# Patient Record
Sex: FEMALE | Race: WHITE | URBAN - NONMETROPOLITAN AREA
[De-identification: names, ages, dates, MRNs, and addresses within clinical notes are randomized per-mention and may not be internally consistent; named-entity substitution may affect disease eponyms.]

---

## 2020-09-10 ENCOUNTER — NEW PATIENT (OUTPATIENT)
Dept: URBAN - NONMETROPOLITAN AREA CLINIC 12 | Facility: CLINIC | Age: 70
End: 2020-09-10
Payer: MEDICARE

## 2020-09-10 DIAGNOSIS — H25.813 COMBINED FORMS OF AGE-RELATED CATARACT, BILATERAL: Primary | ICD-10-CM

## 2020-09-10 DIAGNOSIS — H16.223 KERATOCONJUNCTIVITIS SICCA, BILATERAL: ICD-10-CM

## 2020-09-10 PROCEDURE — 92004 COMPRE OPH EXAM NEW PT 1/>: CPT | Performed by: OPTOMETRIST

## 2020-09-10 ASSESSMENT — INTRAOCULAR PRESSURE
OS: 19
OD: 19

## 2020-09-10 ASSESSMENT — VISUAL ACUITY
OS: 20/25
OD: 20/30

## 2021-06-17 ENCOUNTER — OFFICE VISIT (OUTPATIENT)
Dept: URBAN - NONMETROPOLITAN AREA CLINIC 12 | Facility: CLINIC | Age: 71
End: 2021-06-17
Payer: MEDICARE

## 2021-06-17 PROCEDURE — 99213 OFFICE O/P EST LOW 20 MIN: CPT | Performed by: OPTOMETRIST

## 2021-06-17 ASSESSMENT — INTRAOCULAR PRESSURE
OD: 18
OS: 18

## 2021-06-17 ASSESSMENT — VISUAL ACUITY
OD: 20/25
OS: 20/25

## 2021-06-17 NOTE — IMPRESSION/PLAN
Impression: Keratoconjunctivitis sicca, bilateral Plan: Start ATs QID OU. Educated pt. on need for regular treatment and F/U.

## 2021-06-17 NOTE — IMPRESSION/PLAN
Impression: Combined forms of age-related cataract, bilateral Plan: Significantly worsened since last visit. Monitor x 6 months. RTC with any changes to vision.

## 2022-07-20 ENCOUNTER — OFFICE VISIT (OUTPATIENT)
Dept: URBAN - NONMETROPOLITAN AREA CLINIC 14 | Facility: CLINIC | Age: 72
End: 2022-07-20
Payer: MEDICARE

## 2022-07-20 DIAGNOSIS — H25.813 COMBINED FORMS OF AGE-RELATED CATARACT, BILATERAL: Primary | ICD-10-CM

## 2022-07-20 DIAGNOSIS — H16.223 KERATOCONJUNCTIVITIS SICCA, BILATERAL: ICD-10-CM

## 2022-07-20 PROCEDURE — 99214 OFFICE O/P EST MOD 30 MIN: CPT | Performed by: OPTOMETRIST

## 2022-07-20 ASSESSMENT — VISUAL ACUITY
OD: 20/30
OS: 20/30

## 2022-07-20 ASSESSMENT — INTRAOCULAR PRESSURE
OS: 13
OD: 16

## 2022-07-20 NOTE — IMPRESSION/PLAN
Impression: Combined forms of age-related cataract, bilateral Plan: Refer for CE OU. Educated pt. on risks/benefits of CE. Pt. elects to proceed with CE at this time.

## 2022-09-01 ENCOUNTER — PRE-OPERATIVE VISIT (OUTPATIENT)
Dept: URBAN - NONMETROPOLITAN AREA CLINIC 13 | Facility: CLINIC | Age: 72
End: 2022-09-01
Payer: MEDICARE

## 2022-09-01 DIAGNOSIS — H25.812 COMBINED FORMS OF AGE-RELATED CATARACT, LEFT EYE: ICD-10-CM

## 2022-09-01 DIAGNOSIS — H25.813 COMBINED FORMS OF AGE-RELATED CATARACT, BILATERAL: Primary | ICD-10-CM

## 2022-09-01 PROCEDURE — 99204 OFFICE O/P NEW MOD 45 MIN: CPT | Performed by: OPHTHALMOLOGY

## 2022-09-01 ASSESSMENT — INTRAOCULAR PRESSURE
OS: 16
OD: 16

## 2022-09-01 ASSESSMENT — VISUAL ACUITY
OS: 20/25
OD: 20/25

## 2022-09-01 NOTE — IMPRESSION/PLAN
Impression: Combined forms of age-related cataract, bilateral: H25.813. Plan: Discussed diagnosis in detail with patient. Discussed treatment options with patient. Patient elects to have surgery. Surgical risks and benefits were discussed, explained and understood by patient.  OD then OS std iol aim -2.25 OD -2.25 OS ok dexycu order ascan/optical biometry for iol calculations

## 2022-09-12 ENCOUNTER — ADULT PHYSICAL (OUTPATIENT)
Dept: URBAN - NONMETROPOLITAN AREA CLINIC 13 | Facility: CLINIC | Age: 72
End: 2022-09-12
Payer: MEDICARE

## 2022-09-12 DIAGNOSIS — Z01.818 ENCOUNTER FOR OTHER PREPROCEDURAL EXAMINATION: Primary | ICD-10-CM

## 2022-09-12 DIAGNOSIS — H25.813 COMBINED FORMS OF AGE-RELATED CATARACT, BILATERAL: ICD-10-CM

## 2022-09-12 PROCEDURE — 99203 OFFICE O/P NEW LOW 30 MIN: CPT | Performed by: PHYSICIAN ASSISTANT

## 2022-09-19 ENCOUNTER — SURGERY (OUTPATIENT)
Dept: URBAN - NONMETROPOLITAN AREA SURGERY 4 | Facility: SURGERY | Age: 72
End: 2022-09-19
Payer: MEDICARE

## 2022-09-19 DIAGNOSIS — H25.813 COMBINED FORMS OF AGE-RELATED CATARACT, BILATERAL: Primary | ICD-10-CM

## 2022-09-19 PROCEDURE — 66984 XCAPSL CTRC RMVL W/O ECP: CPT | Performed by: OPHTHALMOLOGY

## 2022-09-20 ENCOUNTER — POST-OPERATIVE VISIT (OUTPATIENT)
Dept: URBAN - NONMETROPOLITAN AREA CLINIC 14 | Facility: CLINIC | Age: 72
End: 2022-09-20
Payer: MEDICARE

## 2022-09-20 DIAGNOSIS — Z48.810 ENCOUNTER FOR SURGICAL AFTERCARE FOLLOWING SURGERY ON A SENSE ORGAN: Primary | ICD-10-CM

## 2022-09-20 PROCEDURE — 99024 POSTOP FOLLOW-UP VISIT: CPT | Performed by: OPTOMETRIST

## 2022-09-20 ASSESSMENT — INTRAOCULAR PRESSURE
OS: 14
OD: 14

## 2022-09-28 ENCOUNTER — POST-OPERATIVE VISIT (OUTPATIENT)
Dept: URBAN - NONMETROPOLITAN AREA CLINIC 14 | Facility: CLINIC | Age: 72
End: 2022-09-28
Payer: MEDICARE

## 2022-09-28 DIAGNOSIS — Z48.810 ENCOUNTER FOR SURGICAL AFTERCARE FOLLOWING SURGERY ON A SENSE ORGAN: Primary | ICD-10-CM

## 2022-09-28 PROCEDURE — 99024 POSTOP FOLLOW-UP VISIT: CPT | Performed by: OPTOMETRIST

## 2022-09-28 RX ORDER — PREDNISOLONE ACETATE 10 MG/ML
1 % SUSPENSION/ DROPS OPHTHALMIC
Qty: 10 | Refills: 0 | Status: ACTIVE
Start: 2022-09-28

## 2022-09-28 ASSESSMENT — VISUAL ACUITY
OD: 20/20
OS: 20/25

## 2022-09-28 ASSESSMENT — INTRAOCULAR PRESSURE
OD: 11
OS: 12

## 2022-09-28 NOTE — IMPRESSION/PLAN
Impression: S/P Cataract Extraction by phacoemulsification with IOL placement OD - 9 Days. Encounter for surgical aftercare following surgery on a sense organ  Z48.810. Plan: Pt doing well. Proceed with CE OS. Start Pred Forte QD OD. RTC PRN with increased symptoms.

## 2022-10-10 ENCOUNTER — SURGERY (OUTPATIENT)
Dept: URBAN - NONMETROPOLITAN AREA SURGERY 4 | Facility: SURGERY | Age: 72
End: 2022-10-10
Payer: MEDICARE

## 2022-10-10 DIAGNOSIS — H25.812 COMBINED FORMS OF AGE-RELATED CATARACT, LEFT EYE: Primary | ICD-10-CM

## 2022-10-10 PROCEDURE — 66984 XCAPSL CTRC RMVL W/O ECP: CPT | Performed by: OPHTHALMOLOGY

## 2022-10-11 ENCOUNTER — POST-OPERATIVE VISIT (OUTPATIENT)
Dept: URBAN - NONMETROPOLITAN AREA CLINIC 14 | Facility: CLINIC | Age: 72
End: 2022-10-11
Payer: MEDICARE

## 2022-10-11 DIAGNOSIS — Z48.810 ENCOUNTER FOR SURGICAL AFTERCARE FOLLOWING SURGERY ON A SENSE ORGAN: Primary | ICD-10-CM

## 2022-10-11 PROCEDURE — 99024 POSTOP FOLLOW-UP VISIT: CPT | Performed by: OPTOMETRIST

## 2022-10-11 ASSESSMENT — INTRAOCULAR PRESSURE
OD: 11
OS: 14

## 2022-10-11 NOTE — IMPRESSION/PLAN
Impression: S/P Cataract Extraction by phacoemulsification with IOL placement OD - 22 Days. Encounter for surgical aftercare following surgery on a sense organ  Z48.810. Plan: Pt doing well. RTC 1 week for F/U or sooner with changes.

## 2022-10-18 ENCOUNTER — POST-OPERATIVE VISIT (OUTPATIENT)
Dept: URBAN - NONMETROPOLITAN AREA CLINIC 14 | Facility: CLINIC | Age: 72
End: 2022-10-18
Payer: MEDICARE

## 2022-10-18 DIAGNOSIS — Z48.810 ENCOUNTER FOR SURGICAL AFTERCARE FOLLOWING SURGERY ON A SENSE ORGAN: Primary | ICD-10-CM

## 2022-10-18 PROCEDURE — 99024 POSTOP FOLLOW-UP VISIT: CPT | Performed by: OPTOMETRIST

## 2022-10-18 ASSESSMENT — VISUAL ACUITY
OS: 20/20
OD: 20/40

## 2022-10-18 ASSESSMENT — INTRAOCULAR PRESSURE
OS: 12
OD: 10

## 2022-10-18 NOTE — IMPRESSION/PLAN
Impression:  Encounter for surgical aftercare following surgery on a sense organ  Z48.810. Plan: Refer for YAG OD. RTC 3 weeks for f/U or sooner with changes.

## 2022-10-31 ENCOUNTER — SURGERY (OUTPATIENT)
Dept: URBAN - NONMETROPOLITAN AREA SURGERY 4 | Facility: SURGERY | Age: 72
End: 2022-10-31
Payer: MEDICARE

## 2022-10-31 PROCEDURE — 66821 AFTER CATARACT LASER SURGERY: CPT | Performed by: OPHTHALMOLOGY

## 2022-11-08 ENCOUNTER — POST-OPERATIVE VISIT (OUTPATIENT)
Dept: URBAN - NONMETROPOLITAN AREA CLINIC 14 | Facility: CLINIC | Age: 72
End: 2022-11-08
Payer: MEDICARE

## 2022-11-08 DIAGNOSIS — Z48.810 ENCOUNTER FOR SURGICAL AFTERCARE FOLLOWING SURGERY ON A SENSE ORGAN: Primary | ICD-10-CM

## 2022-11-08 PROCEDURE — 99024 POSTOP FOLLOW-UP VISIT: CPT | Performed by: OPTOMETRIST

## 2022-11-08 ASSESSMENT — INTRAOCULAR PRESSURE
OS: 9
OD: 10

## 2022-11-08 ASSESSMENT — VISUAL ACUITY
OS: 20/20
OD: 20/20

## 2022-11-08 NOTE — IMPRESSION/PLAN
Impression: S/P YAG Capsulotomy (Yttrium Aluminum Lapel) OD - 8 Days. Encounter for surgical aftercare following surgery on a sense organ  Z48.810. Plan: Pt doing well. RTC 1 year for REE or sooner with changes.

## 2023-11-07 ENCOUNTER — OFFICE VISIT (OUTPATIENT)
Dept: URBAN - NONMETROPOLITAN AREA CLINIC 14 | Facility: CLINIC | Age: 73
End: 2023-11-07
Payer: MEDICARE

## 2023-11-07 DIAGNOSIS — H16.223 KERATOCONJUNCTIVITIS SICCA, BILATERAL: ICD-10-CM

## 2023-11-07 DIAGNOSIS — H26.492 OTHER SECONDARY CATARACT, LEFT EYE: Primary | ICD-10-CM

## 2023-11-07 PROCEDURE — 99214 OFFICE O/P EST MOD 30 MIN: CPT | Performed by: OPTOMETRIST

## 2023-11-07 ASSESSMENT — INTRAOCULAR PRESSURE
OD: 10
OS: 11

## 2024-11-12 ENCOUNTER — OFFICE VISIT (OUTPATIENT)
Dept: URBAN - NONMETROPOLITAN AREA CLINIC 14 | Facility: CLINIC | Age: 74
End: 2024-11-12
Payer: MEDICARE

## 2024-11-12 DIAGNOSIS — H26.492 OTHER SECONDARY CATARACT, LEFT EYE: ICD-10-CM

## 2024-11-12 DIAGNOSIS — Z96.1 PRESENCE OF INTRAOCULAR LENS: Primary | ICD-10-CM

## 2024-11-12 DIAGNOSIS — H52.13 MYOPIA, BILATERAL: ICD-10-CM

## 2024-11-12 PROCEDURE — 92014 COMPRE OPH EXAM EST PT 1/>: CPT | Performed by: OPTOMETRIST

## 2024-11-12 ASSESSMENT — INTRAOCULAR PRESSURE
OS: 15
OD: 14

## 2024-11-12 ASSESSMENT — VISUAL ACUITY
OD: 20/20
OS: 20/20